# Patient Record
Sex: MALE | Race: WHITE | NOT HISPANIC OR LATINO | Employment: OTHER | URBAN - METROPOLITAN AREA
[De-identification: names, ages, dates, MRNs, and addresses within clinical notes are randomized per-mention and may not be internally consistent; named-entity substitution may affect disease eponyms.]

---

## 2017-03-15 ENCOUNTER — IMPORTED ENCOUNTER (OUTPATIENT)
Dept: URBAN - METROPOLITAN AREA CLINIC 9 | Facility: CLINIC | Age: 77
End: 2017-03-15

## 2017-05-11 NOTE — PATIENT DISCUSSION
(H10.45) Other chronic allergic conjunctivitis - Assesment : Examination revealed minimal Allergic Conjunctivitis. - Plan : Monitor for changes. Advised patient to call our office with decreased vision or an increase in symptoms.

## 2018-06-06 ENCOUNTER — IMPORTED ENCOUNTER (OUTPATIENT)
Dept: URBAN - METROPOLITAN AREA CLINIC 9 | Facility: CLINIC | Age: 78
End: 2018-06-06

## 2019-07-10 ENCOUNTER — IMPORTED ENCOUNTER (OUTPATIENT)
Dept: URBAN - METROPOLITAN AREA CLINIC 9 | Facility: CLINIC | Age: 79
End: 2019-07-10

## 2020-09-02 ENCOUNTER — IMPORTED ENCOUNTER (OUTPATIENT)
Dept: URBAN - METROPOLITAN AREA CLINIC 9 | Facility: CLINIC | Age: 80
End: 2020-09-02

## 2020-09-02 PROBLEM — H04.123: Noted: 2020-09-02

## 2020-09-02 PROBLEM — H43.813: Noted: 2020-09-02

## 2020-09-02 PROBLEM — H26.493: Noted: 2020-09-02

## 2020-09-02 PROBLEM — D31.32: Noted: 2020-09-02

## 2021-10-18 ASSESSMENT — KERATOMETRY
OD_K1POWER_DIOPTERS: 43.5
OS_K2POWER_DIOPTERS: 44
OS_AXISANGLE_DEGREES: 62
OS_AXISANGLE_DEGREES: 74
OS_K2POWER_DIOPTERS: 44
OS_K1POWER_DIOPTERS: 43.5
OS_K1POWER_DIOPTERS: 43.25
OD_K1POWER_DIOPTERS: 43.75
OD_AXISANGLE_DEGREES: 24
OD_K1POWER_DIOPTERS: 43.75
OD_AXISANGLE_DEGREES: 125
OD_AXISANGLE2_DEGREES: 114
OS_AXISANGLE2_DEGREES: 164
OS_K2POWER_DIOPTERS: 43.75
OS_AXISANGLE_DEGREES: 75
OD_AXISANGLE_DEGREES: 113
OD_K2POWER_DIOPTERS: 44.25
OD_AXISANGLE2_DEGREES: 23
OD_K2POWER_DIOPTERS: 44.5
OS_K1POWER_DIOPTERS: 43.5
OD_K2POWER_DIOPTERS: 44.5
OS_AXISANGLE2_DEGREES: 165
OD_AXISANGLE2_DEGREES: 35
OS_AXISANGLE2_DEGREES: 152

## 2021-10-18 ASSESSMENT — VISUAL ACUITY
OD_CC: 20/20 SN
OS_CC: 20/20 SN
OS_CC: 20/20 SN
OD_SC: 20/20 SN
OD_CC: 20/20 SN
OD_CC: 20/20 SN
OS_SC: 20/20 - SN
OD_SC: 20/20 - SN
OS_SC: 20/25 SN
OD_SC: 20/20 SN
OS_SC: 20/20 - SN
OS_CC: 20/20 SN
OS_SC: 20/30 SN
OD_SC: 20/25 SN

## 2021-10-18 ASSESSMENT — TONOMETRY
OD_IOP_MMHG: 17
OD_IOP_MMHG: 16
OS_IOP_MMHG: 15
OD_IOP_MMHG: 15
OS_IOP_MMHG: 16
OD_IOP_MMHG: 11
OS_IOP_MMHG: 11
OS_IOP_MMHG: 17

## 2021-11-17 ENCOUNTER — ESTABLISHED COMPREHENSIVE EXAM (OUTPATIENT)
Dept: URBAN - METROPOLITAN AREA CLINIC 9 | Facility: CLINIC | Age: 81
End: 2021-11-17

## 2021-11-17 DIAGNOSIS — H26.493: ICD-10-CM

## 2021-11-17 DIAGNOSIS — H52.223: ICD-10-CM

## 2021-11-17 PROCEDURE — 92014 COMPRE OPH EXAM EST PT 1/>: CPT

## 2021-11-17 PROCEDURE — 92015 DETERMINE REFRACTIVE STATE: CPT

## 2021-11-17 ASSESSMENT — TONOMETRY
OD_IOP_MMHG: 14
OS_IOP_MMHG: 14

## 2021-11-17 ASSESSMENT — VISUAL ACUITY
OS_SC: 20/25-2
OD_GLARE: 20/25
OD_SC: 20/20-1
OU_SC: 20/20-1
OS_GLARE: 20/20

## 2021-11-17 ASSESSMENT — KERATOMETRY
OD_AXISANGLE_DEGREES: 131
OD_AXISANGLE2_DEGREES: 41
OS_AXISANGLE2_DEGREES: 160
OS_AXISANGLE_DEGREES: 70
OS_K2POWER_DIOPTERS: 43.75
OD_K2POWER_DIOPTERS: 44.25
OD_K1POWER_DIOPTERS: 43.50
OS_K1POWER_DIOPTERS: 43.25

## 2023-04-24 ENCOUNTER — ESTABLISHED PATIENT (OUTPATIENT)
Dept: URBAN - METROPOLITAN AREA CLINIC 9 | Facility: CLINIC | Age: 83
End: 2023-04-24

## 2023-04-24 DIAGNOSIS — H26.493: ICD-10-CM

## 2023-04-24 DIAGNOSIS — H43.813: ICD-10-CM

## 2023-04-24 PROCEDURE — 92015 DETERMINE REFRACTIVE STATE: CPT

## 2023-04-24 PROCEDURE — 92014 COMPRE OPH EXAM EST PT 1/>: CPT

## 2023-04-24 ASSESSMENT — KERATOMETRY
OD_K1POWER_DIOPTERS: 43.50
OD_AXISANGLE_DEGREES: 131
OD_AXISANGLE2_DEGREES: 41
OS_AXISANGLE2_DEGREES: 160
OS_K1POWER_DIOPTERS: 43.25
OD_K2POWER_DIOPTERS: 44.25
OS_AXISANGLE_DEGREES: 70
OS_K2POWER_DIOPTERS: 43.75

## 2023-04-24 ASSESSMENT — TONOMETRY
OD_IOP_MMHG: 14
OS_IOP_MMHG: 14

## 2023-04-24 ASSESSMENT — VISUAL ACUITY
OU_SC: 20/20
OS_GLARE: 20/30
OD_SC: 20/20-1
OS_SC: 20/25
OD_GLARE: 20/30

## 2024-12-12 ENCOUNTER — COMPREHENSIVE EXAM (OUTPATIENT)
Age: 84
End: 2024-12-12

## 2024-12-12 DIAGNOSIS — H04.123: ICD-10-CM

## 2024-12-12 DIAGNOSIS — H52.223: ICD-10-CM

## 2024-12-12 DIAGNOSIS — H26.493: ICD-10-CM

## 2024-12-12 PROCEDURE — 92014 COMPRE OPH EXAM EST PT 1/>: CPT

## 2024-12-12 PROCEDURE — 92015 DETERMINE REFRACTIVE STATE: CPT

## 2025-01-02 ENCOUNTER — SURGERY/PROCEDURE (OUTPATIENT)
Age: 85
End: 2025-01-02

## 2025-01-02 DIAGNOSIS — H26.493: ICD-10-CM

## 2025-01-02 PROCEDURE — 66821 AFTER CATARACT LASER SURGERY: CPT

## 2025-02-18 ENCOUNTER — SURGERY/PROCEDURE (OUTPATIENT)
Age: 85
End: 2025-02-18

## 2025-02-18 DIAGNOSIS — H26.493: ICD-10-CM

## 2025-02-18 PROCEDURE — 66821 AFTER CATARACT LASER SURGERY: CPT | Mod: 79,RT

## 2025-03-11 ENCOUNTER — POST-OP (OUTPATIENT)
Age: 85
End: 2025-03-11

## 2025-03-11 DIAGNOSIS — Z98.890: ICD-10-CM

## 2025-03-11 PROCEDURE — 99024 POSTOP FOLLOW-UP VISIT: CPT

## 2025-06-13 ENCOUNTER — COMPREHENSIVE EXAM (OUTPATIENT)
Age: 85
End: 2025-06-13

## 2025-06-13 DIAGNOSIS — H26.493: ICD-10-CM

## 2025-06-13 DIAGNOSIS — D31.32: ICD-10-CM

## 2025-06-13 DIAGNOSIS — H43.813: ICD-10-CM

## 2025-06-13 PROCEDURE — 92014 COMPRE OPH EXAM EST PT 1/>: CPT

## 2025-06-13 PROCEDURE — 92134 CPTRZ OPH DX IMG PST SGM RTA: CPT

## 2025-06-13 PROCEDURE — 92201 OPSCPY EXTND RTA DRAW UNI/BI: CPT
